# Patient Record
Sex: FEMALE | Race: OTHER | Employment: STUDENT | URBAN - METROPOLITAN AREA
[De-identification: names, ages, dates, MRNs, and addresses within clinical notes are randomized per-mention and may not be internally consistent; named-entity substitution may affect disease eponyms.]

---
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
## 2024-10-28 RX ORDER — CLOBETASOL PROPIONATE 0.5 MG/G
CREAM TOPICAL
Qty: 30 G | Refills: 0 | Status: SHIPPED | OUTPATIENT
Start: 2024-10-28

## 2024-10-28 NOTE — TELEPHONE ENCOUNTER
Refill Request for medication(s):     Last Office Visit: 12/15/23    Last Refill: 12/15/23    Pharmacy, Dosage verified: yes    Condition Update (if applicable):     Rx pended and sent to provider for approval, please advise. Thank You!

## 2025-01-16 RX ORDER — CLOBETASOL PROPIONATE 0.5 MG/G
CREAM TOPICAL
Qty: 30 G | Refills: 0 | OUTPATIENT
Start: 2025-01-16

## 2025-01-16 NOTE — TELEPHONE ENCOUNTER
Refill request has failed the Ambulatory Medication Refill Standing Order and is routed to the primary physician to review the following:    Requested Prescriptions     Refused Prescriptions Disp Refills    CLOBETASOL 0.05 % External Cream [Pharmacy Med Name: CLOBETASOL 0.05% CREAM] 30 g 0     Sig: APPLY TO AFFECTED AREA TWICE A DAY     Refused By: JOI DEL ANGEL     Reason for Refusal: Appt required, please call patient         Pt needs appt, msg sent to pharmacy

## 2025-03-07 RX ORDER — CLOBETASOL PROPIONATE 0.5 MG/G
CREAM TOPICAL
Qty: 30 G | Refills: 0 | OUTPATIENT
Start: 2025-03-07

## 2025-03-14 ENCOUNTER — OFFICE VISIT (OUTPATIENT)
Dept: DERMATOLOGY CLINIC | Facility: CLINIC | Age: 20
End: 2025-03-14

## 2025-03-14 DIAGNOSIS — L30.9 ECZEMA, UNSPECIFIED TYPE: Primary | ICD-10-CM

## 2025-03-14 PROCEDURE — 99213 OFFICE O/P EST LOW 20 MIN: CPT | Performed by: PHYSICIAN ASSISTANT

## 2025-03-14 RX ORDER — CLOBETASOL PROPIONATE 0.5 MG/G
CREAM TOPICAL
Qty: 30 G | Refills: 2 | Status: SHIPPED | OUTPATIENT
Start: 2025-03-14

## 2025-03-14 NOTE — PROGRESS NOTES
HPI:    Patient ID: Margarette Costa is a 20 year old female.    Patient presents for follow-up on eczema on the arms. No draining or tenderness noted. Did have a breakout recently but has been improving. Uses eucrisa and clobetasol when needed. Does space out when she's uses the medicaiton. Really only needs it once per month or every few months. No allergies to medications noted.         Review of Systems   Constitutional:  Negative for chills and fever.   Musculoskeletal:  Negative for arthralgias and myalgias.   Skin:  Positive for rash. Negative for color change and wound.            Current Outpatient Medications   Medication Sig Dispense Refill    Crisaborole 2 % External Ointment Use bid as directed to all berny of eczema including flexures at arms and on face 100 g 5    clobetasol 0.05 % External Cream APPLY TO AFFECTED AREA TWICE A DAY 30 g 2    Desoximetasone 0.25 % External Cream Use on eczema qd 100 g 1    Pimecrolimus (ELIDEL) 1 % External Cream Apply 1 Application topically 2 (two) times daily. Non-steroid cream for eczema 100 g 6    Clindamycin-Tretinoin 1.2-0.025 % External Gel Use every day to acne 30 g 2    Tacrolimus (PROTOPIC) 0.03 % External Ointment Use qhs for eczema on face 100 g 3    Mometasone Furoate (ELOCON) 0.1 % External Solution Use bid to scalp 60 mL 3    LEVOCETIRIZINE DIHYDROCHLORIDE 5 MG Oral Tab TAKE 1 TABLET BY MOUTH EVERY DAY IN THE EVENING 30 tablet 3    triamcinolone acetonide 0.1 % External Cream Use bid as directed 454 g 3    Desonide 0.05 % External Lotion Use bid as directed 120 mL 3     Allergies:Allergies[1]   There were no vitals taken for this visit.  There is no height or weight on file to calculate BMI.  PHYSICAL EXAM:   Physical Exam  Constitutional:       General: She is not in acute distress.     Appearance: Normal appearance.   Skin:     General: Skin is warm and dry.      Findings: Rash present.      Comments: Eczematous areas noted on the right upper arm. No  draining or tendenress noted. No scaling noted. Slight erythema noted.    Neurological:      Mental Status: She is alert and oriented to person, place, and time.                ASSESSMENT/PLAN:   1. Eczema, unspecified type  -After discussion with patient, advised the following:  -Doing well   -Return yearly   -Continue with topicals with eurcrisa and clboetasol   -She will let us know if using the medications daily instead and will likely need to switch her medications.   -To call or follow-up with worsening symptoms or concerns  -Patient was agreeable to plan and will comply with discussion above.         No orders of the defined types were placed in this encounter.      Meds This Visit:  Requested Prescriptions     Signed Prescriptions Disp Refills    Crisaborole 2 % External Ointment 100 g 5     Sig: Use bid as directed to all berny of eczema including flexures at arms and on face    clobetasol 0.05 % External Cream 30 g 2     Sig: APPLY TO AFFECTED AREA TWICE A DAY       Imaging & Referrals:  None         ID#[id number]       [1] No Known Allergies

## (undated) NOTE — LETTER
Name:  Pa Humphrey Year: 9th grade Class: Student ID No.:   Address:  [address] [place] [address] Phone:  [phone] (home) [phone] (work) :  14year old   Name Relationship [address] Work Phone Home Phone Mobile Phone   1. 13. Does anyone in your family have a heart problem, pacemaker, or implanted defibrillator? No   16. Has anyone in your family had unexplained fainting, seizures, or near drowning?  No   BONE AND JOINT QUESTIONS    17. Have you ever had an injury to a bone, 38. Have you ever had numbness, tingling, or weakness in your arms or legs after being hit or falling? No   39. Have you ever been unable to move your arms / legs after being hit /fall? No   40. Have you ever become ill while exercising in the heat?  No   41 MVP, aortic insufficiency) Yes    Eyes/Ears/Nose/Throat:    · Pupils equal  · Hearing Yes    Lymph nodes Yes    Heart*  · Murmurs (auscultation standing, supine, +/- Valsalva)  · Location of point of maximal impulse (PMI) Yes    Pulses: Simultaneous femora Protocol.  We have reviewed the policy and understand that I/our student may be asked to submit to testing for the presence of performance-enhancing substances in my/his/her body either during [employer] state series events or during the school day, and I/our nicho

## (undated) NOTE — LETTER
VACCINE ADMINISTRATION RECORD  PARENT / GUARDIAN APPROVAL  Date: 2017  Vaccine administered to: Harle Goodell     : 2/10/2005    MRN: [id number]    A copy of the appropriate Centers for Disease Control and Prevention Vaccine Information statement h

## (undated) NOTE — ED AVS SNAPSHOT
Parent/Legal Guardian Access to the Online Compass-EOS Record of a Patient 15to 16Years Old  Return completed form by Secure email to [place] HIM/Medical Records Department: [email or web address].     Requirements and Procedures   Under [facility] MyChart ID and password with another person, that person may be able to view my or my child’s health information, and health information about someone who has authorized me as a MyChart proxy.    ·  I agree that it is my responsibility to select a confident Sign-Up Form and I agree to its terms.        Authorization Form     Please enter Patient’s information below:   Name (last, first, middle initial) __________________________________________   Gender  Male  Female    Last 4 Digits of Social Security Number Parent/Legal Guardian Signature                                  For Patient (1517 years of age)  I agree to allow my parent/legal guardian, named above, online access to my medical information currently available and that may become available as a result

## (undated) NOTE — LETTER
[facility] [employer] of Child Health Examination       Student's Name  Margarette Costa Birth Date Title                           Date     Signature HEALTH HISTORY          TO BE COMPLETED AND SIGNED BY PARENT/GUARDIAN AND VERIFIED BY HEALTH CARE PROVIDER    ALLERGIES  (Food, drug, insect, other) MEDICATION  (List all prescribed or taken on a regular basis.)     Diagnosis of asthma?   Child wakes during DIABETES SCREENING  BMI>85% age/sex  No And any two of the following:  Family History Yes   Ethnic Minority  Yes          Signs of Insulin Resistance (hypertension, dyslipidemia, polycystic ovarian syndrome, acanthosis nigricans)    No           At Risk  N Quick-relief  medication (e.g. Short Acting Beta Antagonist): No          Controller medication (e.g. inhaled corticosteroid):   No Other   NEEDS/MODIFICATIONS required in the school setting  None DIETARY Needs/Restrictions     None   SPECIAL INSTR